# Patient Record
Sex: FEMALE | URBAN - METROPOLITAN AREA
[De-identification: names, ages, dates, MRNs, and addresses within clinical notes are randomized per-mention and may not be internally consistent; named-entity substitution may affect disease eponyms.]

---

## 2021-02-04 ENCOUNTER — APPOINTMENT (RX ONLY)
Dept: URBAN - METROPOLITAN AREA CLINIC 134 | Facility: CLINIC | Age: 31
Setting detail: DERMATOLOGY
End: 2021-02-04

## 2021-02-04 VITALS — TEMPERATURE: 97.34 F

## 2021-02-04 DIAGNOSIS — L66.12 FRONTAL FIBROSING ALOPECIA: ICD-10-CM

## 2021-02-04 DIAGNOSIS — L66.8 OTHER CICATRICIAL ALOPECIA: ICD-10-CM

## 2021-02-04 PROCEDURE — ? COUNSELING

## 2021-02-04 PROCEDURE — ? TREATMENT REGIMEN

## 2021-02-04 PROCEDURE — ? PRESCRIPTION

## 2021-02-04 PROCEDURE — ? ADDITIONAL NOTES

## 2021-02-04 PROCEDURE — 99203 OFFICE O/P NEW LOW 30 MIN: CPT

## 2021-02-04 RX ORDER — CLOBETASOL PROPIONATE 0.5 MG/G
AEROSOL, FOAM TOPICAL
Qty: 1 | Refills: 1 | Status: ERX | COMMUNITY
Start: 2021-02-04

## 2021-02-04 RX ADMIN — CLOBETASOL PROPIONATE: 0.5 AEROSOL, FOAM TOPICAL at 00:00

## 2021-02-04 ASSESSMENT — LOCATION ZONE DERM: LOCATION ZONE: SCALP

## 2021-02-04 ASSESSMENT — LOCATION SIMPLE DESCRIPTION DERM: LOCATION SIMPLE: ANTERIOR SCALP

## 2021-02-04 ASSESSMENT — LOCATION DETAILED DESCRIPTION DERM: LOCATION DETAILED: MID-FRONTAL SCALP

## 2021-02-04 NOTE — PROCEDURE: TREATMENT REGIMEN
Detail Level: Zone
Modify Regimen: Leave shampoo in for 2 mins before rinsing
Continue Regimen: Rotate shampoos with Selsun Blue/ Didi/ see if she can get Ducray Kelual online and begin this in rotation
Initiate Treatment: Clobetasol foam BID

## 2021-02-04 NOTE — HPI: HAIR LOSS
Previous Labs: Yes
How Did The Hair Loss Occur?: gradual in onset
How Severe Is Your Hair Loss?: mild
When Were The Labs Drawn? (Drawn...): January, PCP
Lab Details: WNL
What Hair Products Do You Use?: Head and shoulders (doesn’t work)\\nSeslun blue\\n[place]occan oil shampoo\\nOvation and Keatin shampoo (tried in the past)

## 2021-02-04 NOTE — PROCEDURE: ADDITIONAL NOTES
Detail Level: Simple
Additional Notes: I have explained to her that the more she uses flat irons etc. the more trauma she will create and can certainly worsen this condition.  She may continue to dye her hair color as this will not harm her hair.  I have requested that she get me all her labs as soon as possible so that I can review them and see if there is something else that I need to order.
Render Risk Assessment In Note?: no

## 2021-03-03 ENCOUNTER — APPOINTMENT (RX ONLY)
Dept: URBAN - METROPOLITAN AREA CLINIC 134 | Facility: CLINIC | Age: 31
Setting detail: DERMATOLOGY
End: 2021-03-03

## 2021-03-03 VITALS — HEIGHT: 63 IN | WEIGHT: 180 LBS | TEMPERATURE: 97.34 F

## 2021-03-03 DIAGNOSIS — L66.12 FRONTAL FIBROSING ALOPECIA: ICD-10-CM

## 2021-03-03 DIAGNOSIS — L66.8 OTHER CICATRICIAL ALOPECIA: ICD-10-CM

## 2021-03-03 PROCEDURE — 99213 OFFICE O/P EST LOW 20 MIN: CPT

## 2021-03-03 PROCEDURE — ? COUNSELING

## 2021-03-03 PROCEDURE — ? ADDITIONAL NOTES

## 2021-03-03 PROCEDURE — ? TREATMENT REGIMEN

## 2021-03-03 ASSESSMENT — LOCATION SIMPLE DESCRIPTION DERM: LOCATION SIMPLE: ANTERIOR SCALP

## 2021-03-03 ASSESSMENT — LOCATION ZONE DERM: LOCATION ZONE: SCALP

## 2021-03-03 ASSESSMENT — LOCATION DETAILED DESCRIPTION DERM: LOCATION DETAILED: MID-FRONTAL SCALP

## 2021-03-03 NOTE — PROCEDURE: ADDITIONAL NOTES
Render Risk Assessment In Note?: no
Additional Notes: She has worn her hair natural since we saw her last and no itching etc.  She is almost done with her first can of Clobetasol foam and has a second at home. She may have RF’s of this as I don’t want her to stop use of this at this time. Monitor that the foam is not touching her face as this can cause some thinning of her skin.
Detail Level: Simple

## 2021-03-03 NOTE — PROCEDURE: TREATMENT REGIMEN
Detail Level: Zone
Modify Regimen: Leave shampoo in for 2 mins before rinsing
Continue Regimen: Clobetasol foam BID for additional 4-6weeks \\nRotate shampoos with Selsun Blue/ Didi/Jill Medinaual

## 2021-04-07 ENCOUNTER — APPOINTMENT (RX ONLY)
Dept: URBAN - METROPOLITAN AREA CLINIC 134 | Facility: CLINIC | Age: 31
Setting detail: DERMATOLOGY
End: 2021-04-07

## 2021-04-07 VITALS — TEMPERATURE: 96.98 F | WEIGHT: 175 LBS | HEIGHT: 63 IN

## 2021-04-07 DIAGNOSIS — L66.8 OTHER CICATRICIAL ALOPECIA: ICD-10-CM

## 2021-04-07 DIAGNOSIS — L66.12 FRONTAL FIBROSING ALOPECIA: ICD-10-CM

## 2021-04-07 PROCEDURE — ? ADDITIONAL NOTES

## 2021-04-07 PROCEDURE — ? COUNSELING

## 2021-04-07 PROCEDURE — 11900 INJECT SKIN LESIONS </W 7: CPT

## 2021-04-07 PROCEDURE — ? INTRALESIONAL KENALOG

## 2021-04-07 PROCEDURE — ? TREATMENT REGIMEN

## 2021-04-07 ASSESSMENT — LOCATION ZONE DERM: LOCATION ZONE: SCALP

## 2021-04-07 ASSESSMENT — LOCATION SIMPLE DESCRIPTION DERM
LOCATION SIMPLE: ANTERIOR SCALP
LOCATION SIMPLE: SCALP

## 2021-04-07 ASSESSMENT — LOCATION DETAILED DESCRIPTION DERM
LOCATION DETAILED: MID-FRONTAL SCALP
LOCATION DETAILED: LEFT SUPERIOR PARIETAL SCALP

## 2021-04-07 NOTE — PROCEDURE: TREATMENT REGIMEN
Detail Level: Zone
Continue Regimen: Rotate shampoos with Selsun Blue/ Didi
Discontinue Regimen: Clobetasol foam
Modify Regimen: Leave shampoo in for 2 mins before rinsing

## 2021-04-07 NOTE — PROCEDURE: INTRALESIONAL KENALOG
Medical Necessity Clause: This procedure was medically necessary because the lesions that were treated were: not responding to topical steroids ( reaction to topicals developed)
Include Z78.9 (Other Specified Conditions Influencing Health Status) As An Associated Diagnosis?: Yes
Concentration Of Kenalog Solution Injected (Mg/Ml): 10.0
Expiration Date For Kenalog (Optional): 04/2022
Lot # For Kenalog (Optional): [id number]
X Size Of Lesion In Cm (Optional): 0
Detail Level: Detailed
Administered By (Optional): Yolanda Hammer
Total Volume (Ccs): 1.0 ml
Treatment Number (Optional): 1
Kenalog Preparation: Kenalog
Consent: The risks of atrophy were reviewed with the patient.

## 2021-04-07 NOTE — PROCEDURE: ADDITIONAL NOTES
Render Risk Assessment In Note?: no
Detail Level: Simple
Additional Notes: She has worn her hair natural since we saw her last and occasional itching.  The Clobetasol foam is making her scalp itch so I have discussed Kenalog injections and hold the Clobetasol foam.  Monitor that the foam is not touching her face as this can cause some thinning of her skin. She can continue to shampoo every other day and use her selsun blue and [place] oil shampoo.  ILK discussed with her today.

## 2021-05-05 ENCOUNTER — APPOINTMENT (RX ONLY)
Dept: URBAN - METROPOLITAN AREA CLINIC 134 | Facility: CLINIC | Age: 31
Setting detail: DERMATOLOGY
End: 2021-05-05

## 2021-05-05 DIAGNOSIS — L66.8 OTHER CICATRICIAL ALOPECIA: ICD-10-CM

## 2021-05-05 DIAGNOSIS — L66.12 FRONTAL FIBROSING ALOPECIA: ICD-10-CM

## 2021-05-05 PROCEDURE — ? TREATMENT REGIMEN

## 2021-05-05 PROCEDURE — ? COUNSELING

## 2021-05-05 PROCEDURE — ? INTRALESIONAL KENALOG

## 2021-05-05 PROCEDURE — 11900 INJECT SKIN LESIONS </W 7: CPT

## 2021-05-05 PROCEDURE — ? INJECTION

## 2021-05-05 ASSESSMENT — LOCATION DETAILED DESCRIPTION DERM
LOCATION DETAILED: LEFT SUPERIOR PARIETAL SCALP
LOCATION DETAILED: LEFT MEDIAL FRONTAL SCALP
LOCATION DETAILED: MID-FRONTAL SCALP

## 2021-05-05 ASSESSMENT — LOCATION SIMPLE DESCRIPTION DERM
LOCATION SIMPLE: SCALP
LOCATION SIMPLE: LEFT SCALP
LOCATION SIMPLE: ANTERIOR SCALP

## 2021-05-05 ASSESSMENT — LOCATION ZONE DERM: LOCATION ZONE: SCALP

## 2021-05-05 NOTE — PROCEDURE: INTRALESIONAL KENALOG
Lot # For Kenalog (Optional): [id number]
Total Volume (Ccs): 1.0 ml
Consent: The risks of atrophy were reviewed with the patient.
Treatment Number (Optional): 2
Concentration Of Kenalog Solution Injected (Mg/Ml): 10.0
Expiration Date For Kenalog (Optional): 04/2022
X Size Of Lesion In Cm (Optional): 0
Include Z78.9 (Other Specified Conditions Influencing Health Status) As An Associated Diagnosis?: Yes
Administered By (Optional): Yolanda Hammer
Kenalog Preparation: Kenalog
Medical Necessity Clause: This procedure was medically necessary because the lesions that were treated were: not responding to topical steroids ( reaction to topicals developed)
Validate Note Data When Using Inventory: No
Detail Level: Detailed

## 2021-05-05 NOTE — PROCEDURE: INJECTION
Render J-Code Information In Note?: yes
Additional Comments: 1cc of 10mg/ml diluted with saline 0.05 ml used
Dose Administered (Numbers Only - Mg, G, Mcg, Units, Cc): 0
Procedure Information: Please note that the numeric value listed in the Medication (1) and associated J-code units and Medication (2) and associated J-code units variables are j-code amounts and do not represent either the concentration or the total amount of the medications injected.  I strongly recommend selecting no to the Render J-code information in note question. This will allow your note to be more clear. If you are billing j-codes with your injection codes you need to document the total amount of the medication injected. This amount should match the j-code units. For example, if you are injecting Triamcinolone 40mg as an intramuscular injection you would select 40 for the dose field and mg for the units. This would allow you to document  with 4 units (40mg = 10mg x 4). The total volume is not used to calculate j-codes only the amount of the medication administered.
Units: cc
Post-Care Instructions: I reviewed with the patient in detail post-care instructions. Patient understands to keep the injection sites clean and call the clinic if there is any redness, swelling or pain.
Detail Level: None
Total Volume Injected In Cc (Will Not Affected Billing): 1cc
Hide Second Medication?: No
Consent: The risks of the medication were reviewed with the patient.
Route: IL

## 2021-05-05 NOTE — PROCEDURE: TREATMENT REGIMEN
Discontinue Regimen: Clobetasol foam
Initiate Treatment: Nutrafol supplements women’s plus
Continue Regimen: Rotate shampoos with Selsun Blue/ Spanish\\nAllow shampoo to sit on scalp x2 minutes before rinsing
Plan: Injected ILK 10mg 0.5cc diluted with plain lidocaine 1% 0.5cc. Frontal region injected entirely with 1 inch needle and about 5-6 sites
Detail Level: Zone
